# Patient Record
Sex: FEMALE | Race: WHITE | NOT HISPANIC OR LATINO | Employment: UNEMPLOYED | URBAN - METROPOLITAN AREA
[De-identification: names, ages, dates, MRNs, and addresses within clinical notes are randomized per-mention and may not be internally consistent; named-entity substitution may affect disease eponyms.]

---
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
## 2025-02-25 ENCOUNTER — OFFICE VISIT (OUTPATIENT)
Dept: UROLOGY | Facility: CLINIC | Age: 20
End: 2025-02-25
Attending: OBSTETRICS & GYNECOLOGY
Payer: COMMERCIAL

## 2025-02-25 VITALS
WEIGHT: 133.5 LBS | BODY MASS INDEX: 23.65 KG/M2 | DIASTOLIC BLOOD PRESSURE: 72 MMHG | HEIGHT: 63 IN | SYSTOLIC BLOOD PRESSURE: 104 MMHG | HEART RATE: 90 BPM

## 2025-02-25 DIAGNOSIS — K59.00 CONSTIPATION, UNSPECIFIED CONSTIPATION TYPE: ICD-10-CM

## 2025-02-25 DIAGNOSIS — R35.0 URINARY FREQUENCY: ICD-10-CM

## 2025-02-25 DIAGNOSIS — R39.15 URGENCY OF URINATION: ICD-10-CM

## 2025-02-25 DIAGNOSIS — F41.9 ANXIETY: ICD-10-CM

## 2025-02-25 DIAGNOSIS — R10.2 PELVIC PAIN IN FEMALE: Primary | ICD-10-CM

## 2025-02-25 DIAGNOSIS — N32.81 OVERACTIVE BLADDER: ICD-10-CM

## 2025-02-25 PROCEDURE — 99214 OFFICE O/P EST MOD 30 MIN: CPT | Performed by: OBSTETRICS & GYNECOLOGY

## 2025-02-25 RX ORDER — DESOGESTREL AND ETHINYL ESTRADIOL 0.15-0.03
1 KIT ORAL DAILY
COMMUNITY

## 2025-02-25 RX ORDER — HYDROXYZINE HYDROCHLORIDE 25 MG/1
25 TABLET, FILM COATED ORAL
COMMUNITY
Start: 2023-05-02

## 2025-02-25 RX ORDER — DESVENLAFAXINE 25 MG/1
25 TABLET, EXTENDED RELEASE ORAL DAILY
COMMUNITY

## 2025-02-25 RX ORDER — HYDROXYZINE HYDROCHLORIDE 10 MG/1
10 TABLET, FILM COATED ORAL EVERY 4 HOURS PRN
COMMUNITY

## 2025-02-25 ASSESSMENT — PAIN SCALES - GENERAL: PAINLEVEL_OUTOF10: MILD PAIN (3)

## 2025-02-25 NOTE — PROGRESS NOTES
February 25, 2025    Referring Provider: Referred Self, MD  No address on file    Primary Care Provider: Lisa Landers    CC: pelvic pain, urinary hesitancy, urgency, frequency    HPI:  Toyin Lyle is a 19 year old G0 with med hx significant for premenstrual dysphoric disorder (PMDD), anxiety and depression  who presents for evaluation of pelvic pain (primarily with attempt at insertion of tampons), urinary urgency and frequency as well as nocturia for the last 10 months or so. It started without any known physical/psychological/trauma or infectious trigger. She says the start of her symptoms coinsided with the time she started Taty contraception for her PMDD but it did not stop after she stopped the Taty. Recently started hydroxyzine for her anxiety but she has not been taking it regularly due to feeling sedated. She took it last night before bed and says she only woke up once.     Urinary Symptoms/Voiding function  Voids every 1-2 hours during the day and wakes about 5 times at night to void. Half of that time, her bladder is what is waking her up. Denies dysuria, incontinence. Is unsure if she is emptying her bladder well. No gross hematuria.     Gastrointestinal Symptoms:  Has had chronic constipation for about a year. Bms every other day but she has to strain and says her stools can be hard. She also has to wait for a while on the toilet to evacuate.     Sexual function/Pelvic floor pain/GYN:   Never been sexually active. Pain with attempt at inserting tampons ( not able to do it)  Known PMDD being managed by gyn. On Apri. Is occasionally taking hydroxyzine for anxiety.     Relevant Medical History:    Diabetes? no  High Blood pressure? no     Recurrent UTIs? no  Sleep Apnea? no  Obesity? Body mass index is 23.65 kg/m .  History of Blood clots? no  Other medical problems: as above    Surgical History:      No past surgical history on file.    OB/Gyn History:  OB History   No obstetric history on file.        Medications/Vitamins/Supplements:   Current Outpatient Medications   Medication Sig Dispense Refill    hydrOXYzine HCl (ATARAX) 25 MG tablet Take 25 mg by mouth.      Multiple Vitamins-Minerals (WOMENS MULTI PO) Take by mouth.      vitamin B complex with vitamin C (STRESS TAB) tablet Take 1 tablet by mouth daily.      APRI 0.15-30 MG-MCG tablet Take 1 tablet by mouth daily.      desvenlafaxine succinate (PRISTIQ) 25 MG 24 hr tablet Take 25 mg by mouth daily.      hydrOXYzine HCl (ATARAX) 10 MG tablet Take 10 mg by mouth every 4 hours as needed.       No current facility-administered medications for this visit.         Medical History:      No past medical history on file.  ROS  Social History    Social History     Socioeconomic History    Marital status: Single     Spouse name: Not on file    Number of children: Not on file    Years of education: Not on file    Highest education level: Not on file   Occupational History    Not on file   Tobacco Use    Smoking status: Never    Smokeless tobacco: Never   Vaping Use    Vaping status: Never Used   Substance and Sexual Activity    Alcohol use: Never    Drug use: Never    Sexual activity: Not on file   Other Topics Concern    Not on file   Social History Narrative    Not on file     Social Drivers of Health     Financial Resource Strain: High Risk (1/1/2022)    Received from [employer]    Financial Resource Strain     Difficulty of Paying Living Expenses: Not on file     Difficulty of Paying Living Expenses: Not on file   Food Insecurity: Not on file   Transportation Needs: Not on file   Physical Activity: Not on file   Stress: Not on file   Social Connections: Unknown (1/1/2022)    Received from [employer]    Social Connections     Frequency of Communication with Friends and Family: Not on file   Interpersonal Safety: Not on file   Housing Stability: Not on file       Family History  No family  "history on file.    Allergy    Allergies   Allergen Reactions    Nickel Rash     Rash on back & legs    Nitrofurantoin GI Disturbance and Muscle Pain (Myalgia)       No current outpatient medications on file.     No current facility-administered medications for this visit.       Physical Exam: /72   Pulse 90   Ht 1.6 m (5' 2.99\")   Wt 60.6 kg (133 lb 8 oz)   LMP 02/18/2025 (Exact Date)   BMI 23.65 kg/m      There were no vitals taken for this visit. No LMP recorded. There is no height or weight on file to calculate BMI.    Gen:  is alert, anxious    Deferred exam today as patient is not comfortable with pelvic exam     Labs:   No results found for: \"COLOR\", \"APPEARANCE\", \"URINEGLC\", \"URINEBILI\", \"URINEKETONE\", \"SG\", \"URINEPH\", \"PROTEIN\", \"UROBILINOGEN\", \"NITRITE\", \"LEUKEST\"  CBC RESULTS:   Recent Labs   Lab Test 10/17/22  1619   WBC 5.6   RBC 4.59   HGB 12.9   HCT 39.5   MCV 86   MCH 28.1   MCHC 32.7   RDW 12.8            A/P: Toyin Jimenezbrandonmorris is a 19 year old F with   Toyin was seen today for consult.    Diagnoses and all orders for this visit:    Pelvic pain in female  -     Physical Therapy  Referral; Future    Urinary frequency  -     Physical Therapy  Referral; Future    Urgency of urination  -     Physical Therapy  Referral; Future    Overactive bladder  -     Physical Therapy  Referral; Future    Constipation, unspecified constipation type  -     Physical Therapy  Referral; Future    Anxiety      I strongly suspect pelvic floor dysfunction likely myalgia and hypertonic pelvic floor based on her history and symptoms  Unable to do pelvic exam today due to her concern for pain  Agreed to try pelvic PT and perhaps focus on just external pelvic floor relaxation techniques for now and come back to me in a few months to see if we can do a pelvic floor muscle assessment if she tolerates  May need pelvic floor injections if she is unable to tolerate internal " pelvic PT work eventually  Normal post void residual today which is good  We discussed at length that management of stress/anxiety to relieve muscle tension will be very important   Ok to use the hyroxyzine in the evenings to help her with sleep and reduce her nocturia  Encouraged her to get a mental health therapist as well which she is a bit hesitant about ( says she did not have a good experience in the past)  Follow up with me in three months        I spent a total of 45 minutes with  Toyin Lyle (and her mom) on the date of the encounter in chart review, face to face patient visit, review of tests, documentation and/or discussion with other providers about the issues documented above.     Shwetha Catalan MD, [facility]  , Department of OBGYN  Female Pelvic Medicine and Reconstructive Surgery ( Urogynecology)  CC  Patient Care Team:  Lisa Landers APRN CNP as PCP - General  Andreina Jamison NP as Assigned PCP  Shwetha Catalan MD as MD (OB/Gyn)  SELF, REFERRED

## 2025-02-25 NOTE — LETTER
2/25/2025       RE: Toyin Lyle  [address]  [place] [address]     Dear Colleague,    Thank you for referring your patient, Toyin Lyle, to the [facility] [place] [facility]. Please see a copy of my visit note below.    February 25, 2025    Referring Provider: Referred Self, MD  No address on file    Primary Care Provider: Lisa Landers    CC: pelvic pain, urinary hesitancy, urgency, frequency    HPI:  Toyin Lyle is a 19 year old G0 with med hx significant for premenstrual dysphoric disorder (PMDD), anxiety and depression  who presents for evaluation of pelvic pain (primarily with attempt at insertion of tampons), urinary urgency and frequency as well as nocturia for the last 10 months or so. It started without any known physical/psychological/trauma or infectious trigger. She says the start of her symptoms coinsided with the time she started Taty contraception for her PMDD but it did not stop after she stopped the Taty. Recently started hydroxyzine for her anxiety but she has not been taking it regularly due to feeling sedated. She took it last night before bed and says she only woke up once.     Urinary Symptoms/Voiding function  Voids every 1-2 hours during the day and wakes about 5 times at night to void. Half of that time, her bladder is what is waking her up. Denies dysuria, incontinence. Is unsure if she is emptying her bladder well. No gross hematuria.     Gastrointestinal Symptoms:  Has had chronic constipation for about a year. Bms every other day but she has to strain and says her stools can be hard. She also has to wait for a while on the toilet to evacuate.     Sexual function/Pelvic floor pain/GYN:   Never been sexually active. Pain with attempt at inserting tampons ( not able to do it)  Known PMDD being managed by gyn. On Apri. Is occasionally taking hydroxyzine for anxiety.     Relevant Medical History:     Diabetes? no  High Blood pressure? no     Recurrent UTIs? no  Sleep Apnea? no  Obesity? Body mass index is 23.65 kg/m .  History of Blood clots? no  Other medical problems: as above    Surgical History:      No past surgical history on file.    OB/Gyn History:  OB History   No obstetric history on file.       Medications/Vitamins/Supplements:   Current Outpatient Medications   Medication Sig Dispense Refill     hydrOXYzine HCl (ATARAX) 25 MG tablet Take 25 mg by mouth.       Multiple Vitamins-Minerals (WOMENS MULTI PO) Take by mouth.       vitamin B complex with vitamin C (STRESS TAB) tablet Take 1 tablet by mouth daily.       APRI 0.15-30 MG-MCG tablet Take 1 tablet by mouth daily.       desvenlafaxine succinate (PRISTIQ) 25 MG 24 hr tablet Take 25 mg by mouth daily.       hydrOXYzine HCl (ATARAX) 10 MG tablet Take 10 mg by mouth every 4 hours as needed.       No current facility-administered medications for this visit.         Medical History:      No past medical history on file.  ROS  Social History    Social History     Socioeconomic History     Marital status: Single     Spouse name: Not on file     Number of children: Not on file     Years of education: Not on file     Highest education level: Not on file   Occupational History     Not on file   Tobacco Use     Smoking status: Never     Smokeless tobacco: Never   Vaping Use     Vaping status: Never Used   Substance and Sexual Activity     Alcohol use: Never     Drug use: Never     Sexual activity: Not on file   Other Topics Concern     Not on file   Social History Narrative     Not on file     Social Drivers of Health     Financial Resource Strain: High Risk (1/1/2022)    Received from [facility]    Financial Resource Strain      Difficulty of Paying Living Expenses: Not on file      Difficulty of Paying Living Expenses: Not on file   Food Insecurity: Not on file   Transportation Needs: Not on file   Physical Activity: Not  "on file   Stress: Not on file   Social Connections: Unknown (1/1/2022)    Received from [employer] [facility]    Social Connections      Frequency of Communication with Friends and Family: Not on file   Interpersonal Safety: Not on file   Housing Stability: Not on file       Family History  No family history on file.    Allergy    Allergies   Allergen Reactions     Nickel Rash     Rash on back & legs     Nitrofurantoin GI Disturbance and Muscle Pain (Myalgia)       No current outpatient medications on file.     No current facility-administered medications for this visit.       Physical Exam: /72   Pulse 90   Ht 1.6 m (5' 2.99\")   Wt 60.6 kg (133 lb 8 oz)   LMP 02/18/2025 (Exact Date)   BMI 23.65 kg/m      There were no vitals taken for this visit. No LMP recorded. There is no height or weight on file to calculate BMI.    Gen:  is alert, anxious    Deferred exam today as patient is not comfortable with pelvic exam     Labs:   No results found for: \"COLOR\", \"APPEARANCE\", \"URINEGLC\", \"URINEBILI\", \"URINEKETONE\", \"SG\", \"URINEPH\", \"PROTEIN\", \"UROBILINOGEN\", \"NITRITE\", \"LEUKEST\"  CBC RESULTS:   Recent Labs   Lab Test 10/17/22  1619   WBC 5.6   RBC 4.59   HGB 12.9   HCT 39.5   MCV 86   MCH 28.1   MCHC 32.7   RDW 12.8            A/P: Toyin Lyle is a 19 year old F with   Toyin was seen today for consult.    Diagnoses and all orders for this visit:    Pelvic pain in female  -     Physical Therapy  Referral; Future    Urinary frequency  -     Physical Therapy  Referral; Future    Urgency of urination  -     Physical Therapy  Referral; Future    Overactive bladder  -     Physical Therapy  Referral; Future    Constipation, unspecified constipation type  -     Physical Therapy  Referral; Future    Anxiety      I strongly suspect pelvic floor dysfunction likely myalgia and hypertonic pelvic floor based on her history and symptoms  Unable to do " pelvic exam today due to her concern for pain  Agreed to try pelvic PT and perhaps focus on just external pelvic floor relaxation techniques for now and come back to me in a few months to see if we can do a pelvic floor muscle assessment if she tolerates  May need pelvic floor injections if she is unable to tolerate internal pelvic PT work eventually  Normal post void residual today which is good  We discussed at length that management of stress/anxiety to relieve muscle tension will be very important   Ok to use the hyroxyzine in the evenings to help her with sleep and reduce her nocturia  Encouraged her to get a mental health therapist as well which she is a bit hesitant about ( says she did not have a good experience in the past)  Follow up with me in three months        I spent a total of 45 minutes with  Toyin Lyle (and her mom) on the date of the encounter in chart review, face to face patient visit, review of tests, documentation and/or discussion with other providers about the issues documented above.     Shwetha Catalan MD, [facility]  , Department of OBGYN  Female Pelvic Medicine and Reconstructive Surgery ( Urogynecology)  CC  Patient Care Team:  Lisa Landers APRN CNP as PCP - Andreina Soliman NP as Assigned PCP  Shwetha Catalan MD as MD (OB/Gyn)  SELF, REFERRED                Again, thank you for allowing me to participate in the care of your patient.      Sincerely,    Shwetha Catalan MD

## 2025-03-08 ENCOUNTER — HEALTH MAINTENANCE LETTER (OUTPATIENT)
Age: 20
End: 2025-03-08

## 2025-03-13 ENCOUNTER — THERAPY VISIT (OUTPATIENT)
Dept: PHYSICAL THERAPY | Facility: CLINIC | Age: 20
End: 2025-03-13
Attending: OBSTETRICS & GYNECOLOGY
Payer: COMMERCIAL

## 2025-03-13 DIAGNOSIS — K59.00 CONSTIPATION, UNSPECIFIED CONSTIPATION TYPE: ICD-10-CM

## 2025-03-13 DIAGNOSIS — R10.2 PELVIC PAIN IN FEMALE: ICD-10-CM

## 2025-03-13 DIAGNOSIS — R35.0 URINARY FREQUENCY: ICD-10-CM

## 2025-03-13 DIAGNOSIS — N32.81 OVERACTIVE BLADDER: ICD-10-CM

## 2025-03-13 DIAGNOSIS — R39.15 URGENCY OF URINATION: ICD-10-CM

## 2025-03-13 NOTE — PROGRESS NOTES
PHYSICAL THERAPY EVALUATION  Type of Visit: Evaluation       Fall Risk Screen:  Fall screen completed by: PT  Have you fallen 2 or more times in the past year?: No  Have you fallen and had an injury in the past year?: No  Is patient a fall risk?: No    Subjective  - Pt notes pelvic floor dysfunction that has been going on for about a year. Pt has had some life stress that precipitated her symptoms.         Presenting condition or subjective complaint: pelvic floor dysfunction  Date of onset: 02/25/25    Relevant medical history:     Dates & types of surgery:      Prior diagnostic imaging/testing results: X-ray     Prior therapy history for the same diagnosis, illness or injury: No      Living Environment  Social support: With family members   Type of home: House   Stairs to enter the home: No       Ramp: No   Stairs inside the home: Yes 20 Is there a railing: Yes     Help at home: None  Equipment owned:       Employment: Not Applicable    Hobbies/Interests: reading    Patient goals for therapy: sleep through the night without interruption       Objective      PELVIC EVALUATION  ADDITIONAL HISTORY:  Sex assigned at birth: Female  Gender identity: Female    Pronouns: She/Her Hers      Bladder History: Pt pees around hourly per mom, hard for Saga to remember. Pt notes daytime and nighttime urges. Pt notes some rushing to the bathroom.   Feels bladder filling: Yes  Triggers for feeling of inability to wait to go to the bathroom: No    How long can you wait to urinate: ten mins  Gets up at night to urinate: Yes 3  Can stop the flow of urine when urinating: Sometimes  Volume of urine usually released: Medium   Other issues: Straining to pass urine; Slow or hesitant urine stream; Trouble emptying bladder completely - notes straining at the end of urinating, feels like there is some left. Pt notes that her stream starts and stops throughout. Pt notes increased difficulty with public peeing.   Number of bladder infections in  last 12 months:    Fluid intake per day: samy     60oz, one cup coffee or tea   Medications taken for bladder: No     Activities causing urine leak:       Amount of urine typically leaked:     Pads used to help with leaking:          Bowel History: Pt notes that this also began in the last year. Some pain no matter what, more pain and more strain when harder. Pt notes good routine and good diet. Pt has hx of gastroparesis. Holding breath.   Frequency of bowel movement: 1 per two days  Consistency of stool: Hard    Ignores the urge to defecate: No  Other bowel issues: Pain when pooping; Straining to have bowel movement  Length of time spent trying to have a bowel movement: 7 mins    Sexual Function History: Pt notes that she frequently feels like she has a lot of general pelvic discomfort around her bladder. Pt notes a precrampy feeling in her lower abdomen.   Sexual orientation: Prefer not to say    Sexually active: No  Lubrication used:      Pelvic pain: Walking; Standing; Sitting; Certain positions; Use of tampon i have pelvic discomfort a lot, comes and goes. More noticeable if having a bowel movement. Pt unable to use a tampon ever.   Pain or difficulty with orgasms/erection/ejaculation:      State of menopause:    Hormone medications: Yes birth control    Do you get regular exercise: Yes.   I do this type of exercise: walking  Have you tried pelvic floor strengthening exercises for 4 weeks: No  Do you have any history of trauma that is relevant to your care that you d like to share: No      Discussed reason for referral regarding pelvic health needs and external/internal pelvic floor muscle examination with patient/guardian.  Opportunity provided to ask questions and verbal consent for assessment and intervention was given.    POSTURE: Standing Posture: Rounded shoulders, Lordosis increased  LUMBAR SCREEN: AROM WFL, hamstring tightness into flexion  HIP SCREEN:  Strength:  Hip Flex: 5-/5 bilat, Hip IR: 5/5  bilat, Hip ER: 5-/5 bilat     Functional Strength Testing: Double Leg Squat: Good technique/no significant findings  SLS: + hip drop     PELVIC/SI SCREEN: Reduced hamstring and glute flexibility   BREATHING SYMMETRY: Rib cage flaring    PELVIC EXAM  External Visual Inspection: Deferred today.     ABDOMINAL ASSESSMENT    Fascial Tension/Restriction: Hypomobile, TTP rectus bellies R>L, hypomobile lateral bladder    BIOFEEDBACK:  Position: Supine, Sit  Surface Electrodes: Perineal    Perianals:   Baseline:      Quick Flicks:    Final Baseline:      Assessment & Plan   CLINICAL IMPRESSIONS  Medical Diagnosis: Pelvic pain in female  Urinary frequency  Urgency of urination  Overactive bladder  Constipation, unspecified constipation type    Treatment Diagnosis: Pelvic pain in female  Urinary frequency  Urgency of urination  Overactive bladder  Constipation, unspecified constipation type   Impression/Assessment: Patient is a 19 year old female with pelvic floor dysfunction complaints.  The following significant findings have been identified: Decreased ROM/flexibility, Decreased strength, Impaired balance, Decreased proprioception, Impaired muscle performance, Decreased activity tolerance, and Impaired posture. These impairments interfere with their ability to perform self care tasks, recreational activities, and household chores as compared to previous level of function.     Clinical Decision Making (Complexity):  Clinical Presentation: Stable/Uncomplicated  Clinical Presentation Rationale: based on medical and personal factors listed in PT evaluation  Clinical Decision Making (Complexity): Low complexity    PLAN OF CARE  Treatment Interventions:  Modalities: Biofeedback  Interventions: Manual Therapy, Neuromuscular Re-education, Therapeutic Activity, Therapeutic Exercise, Self-Care/Home Management    Long Term Goals     PT Goal 1  Goal Identifier: Internal Exam  Goal Description: Pt will be able to tolerate an internal  exam with Urology for wellness and additional testing.  Rationale: to maximize safety and independence with self cares  Target Date: 06/10/25  PT Goal 2  Goal Identifier: Nocturia  Goal Description: Pt will only wake up 1x per night to void.  Rationale: to maximize safety and independence with self cares  Target Date: 06/10/25      Frequency of Treatment: 1x per week for 4 weeks, 2x per month for 2 months  Duration of Treatment: 12 weeks    Education Assessment:   Learner/Method: Patient;No Barriers to Learning    Risks and benefits of evaluation/treatment have been explained.   Patient/Family/caregiver agrees with Plan of Care.     Evaluation Time:     PT Eval, Low Complexity Minutes (00296): 27  Signing Clinician: Digna Pino PT

## 2025-03-20 ENCOUNTER — THERAPY VISIT (OUTPATIENT)
Dept: PHYSICAL THERAPY | Facility: CLINIC | Age: 20
End: 2025-03-20
Payer: COMMERCIAL

## 2025-03-20 DIAGNOSIS — R35.0 URINARY FREQUENCY: ICD-10-CM

## 2025-03-20 DIAGNOSIS — R39.15 URGENCY OF URINATION: ICD-10-CM

## 2025-03-20 DIAGNOSIS — N32.81 OVERACTIVE BLADDER: Primary | ICD-10-CM

## 2025-03-20 DIAGNOSIS — R10.2 PELVIC PAIN IN FEMALE: ICD-10-CM

## 2025-04-17 ENCOUNTER — THERAPY VISIT (OUTPATIENT)
Dept: PHYSICAL THERAPY | Facility: CLINIC | Age: 20
End: 2025-04-17
Payer: COMMERCIAL

## 2025-04-17 DIAGNOSIS — N32.81 OVERACTIVE BLADDER: Primary | ICD-10-CM

## 2025-04-17 DIAGNOSIS — R10.2 PELVIC PAIN IN FEMALE: ICD-10-CM

## 2025-04-17 DIAGNOSIS — R35.0 URINARY FREQUENCY: ICD-10-CM

## 2025-04-17 DIAGNOSIS — R39.15 URGENCY OF URINATION: ICD-10-CM

## 2025-05-08 ENCOUNTER — THERAPY VISIT (OUTPATIENT)
Dept: PHYSICAL THERAPY | Facility: CLINIC | Age: 20
End: 2025-05-08
Payer: COMMERCIAL

## 2025-05-08 DIAGNOSIS — N32.81 OVERACTIVE BLADDER: Primary | ICD-10-CM

## 2025-05-08 DIAGNOSIS — R39.15 URGENCY OF URINATION: ICD-10-CM

## 2025-05-08 DIAGNOSIS — R10.2 PELVIC PAIN IN FEMALE: ICD-10-CM

## 2025-05-08 DIAGNOSIS — R35.0 URINARY FREQUENCY: ICD-10-CM

## 2025-05-27 ENCOUNTER — OFFICE VISIT (OUTPATIENT)
Dept: UROLOGY | Facility: CLINIC | Age: 20
End: 2025-05-27
Attending: OBSTETRICS & GYNECOLOGY
Payer: COMMERCIAL

## 2025-05-27 VITALS
SYSTOLIC BLOOD PRESSURE: 108 MMHG | BODY MASS INDEX: 23.66 KG/M2 | HEART RATE: 73 BPM | DIASTOLIC BLOOD PRESSURE: 74 MMHG | HEIGHT: 63 IN

## 2025-05-27 DIAGNOSIS — R39.15 URGENCY OF URINATION: ICD-10-CM

## 2025-05-27 DIAGNOSIS — R35.0 URINARY FREQUENCY: ICD-10-CM

## 2025-05-27 DIAGNOSIS — R35.1 NOCTURIA: ICD-10-CM

## 2025-05-27 DIAGNOSIS — N32.81 OVERACTIVE BLADDER: Primary | ICD-10-CM

## 2025-05-27 PROCEDURE — 3074F SYST BP LT 130 MM HG: CPT | Performed by: OBSTETRICS & GYNECOLOGY

## 2025-05-27 PROCEDURE — 99213 OFFICE O/P EST LOW 20 MIN: CPT | Performed by: OBSTETRICS & GYNECOLOGY

## 2025-05-27 PROCEDURE — 99214 OFFICE O/P EST MOD 30 MIN: CPT | Performed by: OBSTETRICS & GYNECOLOGY

## 2025-05-27 PROCEDURE — 3078F DIAST BP <80 MM HG: CPT | Performed by: OBSTETRICS & GYNECOLOGY

## 2025-05-27 NOTE — PROGRESS NOTES
Ms Lyle is here to follow up on her overactive bladder symptoms of urgency, frequency and nocturia. I last saw her as a new patient on 2.25.25 and at that time she was not ready for a pelvic exam but  her symptoms were consistent with likely pelvic floor dysfunction ( myalgia/possible hypertonicity) which may be driving her OAB symptoms in the setting of stress/anxiety. I had referred her for pelvic PT and encouraged her to get mental health support and return for a pelvic exam when she is ready. She has had four pelvic PT sessions since her visit with me and says that they have been doing external work up to this point ( no internal work yet) and she feels like it is helping some.  She does say that she understands that her stress/anxiety is playing a big role and that until she addresses this, she may not get the full benefit of the physical therapy. The good news is that she now has a therapist that she is comfortable with.    This note was copied and pasted from Dr Catalan on 2.25.25    CC: pelvic pain, urinary hesitancy, urgency, frequency    HPI:  Toyin Lyle is a 19 year old G0 with med hx significant for premenstrual dysphoric disorder (PMDD), anxiety and depression  who presents for evaluation of pelvic pain (primarily with attempt at insertion of tampons), urinary urgency and frequency as well as nocturia for the last 10 months or so. It started without any known physical/psychological/trauma or infectious trigger. She says the start of her symptoms coinsided with the time she started Taty contraception for her PMDD but it did not stop after she stopped the Taty. Recently started hydroxyzine for her anxiety but she has not been taking it regularly due to feeling sedated. She took it last night before bed and says she only woke up once.     Urinary Symptoms/Voiding function  Voids every 1-2 hours during the day and wakes about 5 times at night to void. Half of that time, her bladder is what is waking  her up. Denies dysuria, incontinence. Is unsure if she is emptying her bladder well. No gross hematuria.     Gastrointestinal Symptoms:  Has had chronic constipation for about a year. Bms every other day but she has to strain and says her stools can be hard. She also has to wait for a while on the toilet to evacuate.     Sexual function/Pelvic floor pain/GYN:   Never been sexually active. Pain with attempt at inserting tampons ( not able to do it)  Known PMDD being managed by gyn. On Apri. Is occasionally taking hydroxyzine for anxiety.     Relevant Medical History:    Diabetes? no  High Blood pressure? no     Recurrent UTIs? no  Sleep Apnea? no  Obesity? Body mass index is 23.65 kg/m .  History of Blood clots? no  Other medical problems: as above     Pelvic exam  Attempted to do a pelvic exam today  Was only able to put one finger at the introitus which caused a lot of pain for her. Unable to advance finger into the vagina  Muscle spasms visible at vaginal opening along with a lot of guarding.             Assessment and Plan  Encounter Diagnoses   Name Primary?    Overactive bladder Yes    Urgency of urination     Urinary frequency     Nocturia      She clearly understands the role stress /anxiety is playing on her pelvic floor and bladder symptoms  Is now getting some mental health support  Is working with our pelvic floor physical therapists and may in time be able to tolerate internal work  Gave her a small dilator today to practice slow insertion and to start getting comfortable with this  I discussed that we could try pelvic floor injection with botox/bupivicaine to help relieve some tension and make it possible for internal PT work but she is not sure yet if she wants to do this. Will consider cystoscopy at the same time if we do pelvic floor injections under sedation    Follow up with me as needed to discuss next steps.       I spent 30 face-to-face with Toyin Lyle on the date of the encounter in chart  review, patient visit, review of tests, documentation and/or discussion with other providers about the issues documented above.

## 2025-05-27 NOTE — PATIENT INSTRUCTIONS
Thank you for trusting us with your care!   Please be aware, if you are on Mychart, you may see your results prior to your providers review. If labs are abnormal, we will call or message you on Monthlyst with a follow up plan.    If you need to contact us for questions about:  Symptoms, Scheduling & Medical Questions; Non-urgent (2-3 day response) Conferize message, Urgent (needing response today) [phone] (if after 3:30pm next day response)   Prescriptions: Please call your Pharmacy   Billing: Jayme [phone] or [facility]:[phone]

## 2025-05-27 NOTE — LETTER
5/27/2025       RE: Toyin Lyle  [address]  [place] [address]     Dear Colleague,    Thank you for referring your patient, Toyin Lyle, to the [facility]. Please see a copy of my visit note below.    Ms Lyle is here to follow up on her overactive bladder symptoms of urgency, frequency and nocturia. I last saw her as a new patient on 2.25.25 and at that time she was not ready for a pelvic exam but  her symptoms were consistent with likely pelvic floor dysfunction ( myalgia/possible hypertonicity) which may be driving her OAB symptoms in the setting of stress/anxiety. I had referred her for pelvic PT and encouraged her to get mental health support and return for a pelvic exam when she is ready. She has had four pelvic PT sessions since her visit with me and says that they have been doing external work up to this point ( no internal work yet) and she feels like it is helping some.  She does say that she understands that her stress/anxiety is playing a big role and that until she addresses this, she may not get the full benefit of the physical therapy. The good news is that she now has a therapist that she is comfortable with.    This note was copied and pasted from Dr Catalan on 2.25.25    CC: pelvic pain, urinary hesitancy, urgency, frequency    HPI:  Toyin Lyle is a 19 year old G0 with med hx significant for premenstrual dysphoric disorder (PMDD), anxiety and depression  who presents for evaluation of pelvic pain (primarily with attempt at insertion of tampons), urinary urgency and frequency as well as nocturia for the last 10 months or so. It started without any known physical/psychological/trauma or infectious trigger. She says the start of her symptoms coinsided with the time she started Taty contraception for her PMDD but it did not stop after she stopped the Taty. Recently started hydroxyzine for her  anxiety but she has not been taking it regularly due to feeling sedated. She took it last night before bed and says she only woke up once.     Urinary Symptoms/Voiding function  Voids every 1-2 hours during the day and wakes about 5 times at night to void. Half of that time, her bladder is what is waking her up. Denies dysuria, incontinence. Is unsure if she is emptying her bladder well. No gross hematuria.     Gastrointestinal Symptoms:  Has had chronic constipation for about a year. Bms every other day but she has to strain and says her stools can be hard. She also has to wait for a while on the toilet to evacuate.     Sexual function/Pelvic floor pain/GYN:   Never been sexually active. Pain with attempt at inserting tampons ( not able to do it)  Known PMDD being managed by gyn. On Apri. Is occasionally taking hydroxyzine for anxiety.     Relevant Medical History:    Diabetes? no  High Blood pressure? no     Recurrent UTIs? no  Sleep Apnea? no  Obesity? Body mass index is 23.65 kg/m .  History of Blood clots? no  Other medical problems: as above     Pelvic exam  Attempted to do a pelvic exam today  Was only able to put one finger at the introitus which caused a lot of pain for her. Unable to advance finger into the vagina  Muscle spasms visible at vaginal opening along with a lot of guarding.             Assessment and Plan  Encounter Diagnoses   Name Primary?     Overactive bladder Yes     Urgency of urination      Urinary frequency      Nocturia      She clearly understands the role stress /anxiety is playing on her pelvic floor and bladder symptoms  Is now getting some mental health support  Is working with our pelvic floor physical therapists and may in time be able to tolerate internal work  Gave her a small dilator today to practice slow insertion and to start getting comfortable with this  I discussed that we could try pelvic floor injection with botox/bupivicaine to help relieve some tension and make it  possible for internal PT work but she is not sure yet if she wants to do this. Will consider cystoscopy at the same time if we do pelvic floor injections under sedation    Follow up with me as needed to discuss next steps.       I spent 30 face-to-face with Toyin Lyle on the date of the encounter in chart review, patient visit, review of tests, documentation and/or discussion with other providers about the issues documented above.       Again, thank you for allowing me to participate in the care of your patient.      Sincerely,    Shwetha Catalan MD

## 2025-08-07 ENCOUNTER — THERAPY VISIT (OUTPATIENT)
Dept: PHYSICAL THERAPY | Facility: CLINIC | Age: 20
End: 2025-08-07
Payer: COMMERCIAL

## 2025-08-07 DIAGNOSIS — N32.81 OVERACTIVE BLADDER: Primary | ICD-10-CM

## 2025-08-07 DIAGNOSIS — R10.2 PELVIC PAIN IN FEMALE: ICD-10-CM

## 2025-08-07 DIAGNOSIS — R35.0 URINARY FREQUENCY: ICD-10-CM

## 2025-08-07 DIAGNOSIS — R39.15 URGENCY OF URINATION: ICD-10-CM
